# Patient Record
(demographics unavailable — no encounter records)

---

## 2024-12-20 NOTE — REVIEW OF SYSTEMS
[Vomiting] : vomiting [Nausea] : nausea [Fever] : no fever [Chills] : no chills [Dyspnea] : no dyspnea [Chest Pain] : no chest pain [Abdominal Pain] : no abdominal pain [Dysuria] : no dysuria [Pelvic pain] : no pelvic pain
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
## 2024-12-20 NOTE — PHYSICAL EXAM
[Appropriately responsive] : appropriately responsive [No Acute Distress] : no acute distress [Soft] : soft [Non-tender] : non-tender [Non-distended] : non-distended [No Mass] : no mass [FreeTextEntry4] : Clinically well perfused  [FreeTextEntry5] : Breathing comfortably on room air

## 2024-12-20 NOTE — DISCUSSION/SUMMARY
[FreeTextEntry1] : 31yo  presents for confirmation of pregnancy. LMP was 10/28/24. Transvaginal US performed today and pregnancy was confirmed. CRL was 0.88 mm, correlating with 6w6d. GA 6w3d by LMP. Patient at her baseline state of health and will schedule PCAP appointment.  Nausea -Recommended patient can taking vitamin B6 for nausea   Plan -Nuchal translucency US ordered  -Urine G/C performed today, f/u results -RTC around 11-12 weeks GA for PCAP appointment   Whit Birmingham PGY1 D/w Dr. Arboleda, attending

## 2024-12-20 NOTE — DISCUSSION/SUMMARY
[FreeTextEntry1] : 33yo  presents for confirmation of pregnancy. LMP was 10/28/24. Transvaginal US performed today and pregnancy was confirmed. CRL was 0.88 mm, correlating with 6w6d. GA 6w3d by LMP. Patient at her baseline state of health and will schedule PCAP appointment.  Nausea -Recommended patient can taking vitamin B6 for nausea   Plan -Nuchal translucency US ordered  -Urine G/C performed today, f/u results -RTC around 11-12 weeks GA for PCAP appointment   Whit Birmingham PGY1 D/w Dr. Arboleda, attending

## 2025-02-25 NOTE — REASON FOR VISIT
[Home] : at home, [unfilled] , at the time of the visit. [Other Location: e.g. Home (Enter Location, City,State)___] : at [unfilled] [Telehealth (audio & video)] : This visit was provided via telehealth using real-time 2-way audio visual technology. [Verbal consent obtained from patient] : the patient, [unfilled] [Initial Consultation] : an initial consultation for [FreeTextEntry2] : anemia

## 2025-02-25 NOTE — REVIEW OF SYSTEMS
[Fever] : no fever [Chills] : no chills [Night Sweats] : no night sweats [Fatigue] : fatigue [Recent Change In Weight] : ~T no recent weight change [Diarrhea: Grade 0] : Diarrhea: Grade 0 [Negative] : Allergic/Immunologic
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
## 2025-02-25 NOTE — ASSESSMENT
[FreeTextEntry1] : cc: anemia post miscarriage  32. year old female. with Pmhx  recent Miscarriage on 1/25/25  at 14 weeks.  states she was at work when she felt sudden contractions and broke her water and delivered stillbirth,  since then she had heavy menses that are on/ off.  She denies having D&C. Denies CP and SOB,  but admits to passing out but did not seek medical attention  She started Ferrous sulfate was started on 2/20/25 QD that was given by Dr Mtz and she tolerating it well.  most recent labs CBC on 2/18 WBC 9.92 RBC 3.6 hgb 7.8 HCT 26.1%MCV 21.7 PLT 535K no Iron studies available.    Problem # 1 Anemia of iron def ? - CBC, CMP for baseline - nutritional deficiency screen: B12/folate, - bleeding screen: iron studies - hemolytic screen- LDH/hapto.retic - chronic disease markers- ESR/CRP -advised that if syncope recorrus to please fo to nearest ED  -to increased PO iron ferrous sulfate to BID will send colace to prevent constipation.  until we get back iron studies  -Pt advised to go to local lab at a [facility] ASAP and once done to call us back to set up an f/u appointment on friday 2/28/25  after lab draw   to go over lab results via tele. Pt understands and agrees with plan. All questions answered to patients satisfaction.  -tasked [facility] to email and mail her lab orders and [facility] locations .
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
## 2025-03-03 NOTE — ASSESSMENT
[FreeTextEntry1] : cc: anemia post miscarriage 32. year old female. with Pmhx recent Miscarriage on 1/25/25 at 14 weeks. states she was at work when she felt sudden contractions and broke her water and delivered stillbirth, since then she had heavy menses that are on/ off. She denies having D&C. Denies CP and SOB, but admits to passing out but did not seek medical attention She started Ferrous sulfate was started on 2/20/25 QD that was given by Dr Mtz and she tolerating it well. most recent labs CBC on 2/18 WBC 9.92 RBC 3.6 hgb 7.8 HCT 26.1%MCV 21.7 PLT 535K no Iron studies available.  Problem # 1 Anemia of iron def ? - CBC, CMP for baseline - nutritional deficiency screen: B12/folate, - bleeding screen: iron studies - hemolytic screen- LDH/hapto.retic - chronic disease markers- ESR/CRP  3/3/25 anemia of Iron deficiency  using the [place] formula she is 1402  mg Total iron deficit ( weight 202Lb)  Will start with Weekly  IV iron  with feraheme 510 mg x 2 cycles, first cycle 3/9 and 3/13  will check iron studies after 2 nd cycle  Risks, benefits, side effects and administration of tx discussed. Patient verbalized understanding and opted for IV iron Pt consented via telehealth today.

## 2025-03-03 NOTE — REASON FOR VISIT
[Home] : at home, [unfilled] , at the time of the visit. [Other Location: e.g. Home (Enter Location, City,State)___] : at [unfilled] [Telehealth (audio & video)] : This visit was provided via telehealth using real-time 2-way audio visual technology. [Verbal consent obtained from patient] : the patient, [unfilled] [Follow-Up Visit] : a follow-up visit for [FreeTextEntry2] : anemia

## 2025-03-20 NOTE — PHYSICAL EXAM
[No JVD] : no jugular venous distention [Normal] : no joint swelling and grossly normal strength and tone

## 2025-03-20 NOTE — HISTORY OF PRESENT ILLNESS
[de-identified] : 33 y/o here for f/u after several episodes of passing out after bleeding for about one month after Miscarriage 1/25/2025 then bleeding x 1 month passed out 3 times. pt was sent ot hematology and was sent to get Iron infusion x2. Pt now notes feels better now last iron infusion yesterday and states does not have the urge to eat ice is gone. Pt worried since has not had the blood checked and wants to make sure all is ok.

## 2025-03-20 NOTE — REVIEW OF SYSTEMS
[Vaginal Discharge] : vaginal discharge [FreeTextEntry8] : vaginal bleeding spotting will f/u GYN ? polyp

## 2025-03-20 NOTE — HISTORY OF PRESENT ILLNESS
[de-identified] : 33 y/o here for f/u after several episodes of passing out after bleeding for about one month after Miscarriage 1/25/2025 then bleeding x 1 month passed out 3 times. pt was sent ot hematology and was sent to get Iron infusion x2. Pt now notes feels better now last iron infusion yesterday and states does not have the urge to eat ice is gone. Pt worried since has not had the blood checked and wants to make sure all is ok.

## 2025-06-11 NOTE — HISTORY OF PRESENT ILLNESS
[Definite:  ___ (Date)] : the last menstrual period was [unfilled] [Sexually Active] : is sexually active [Contraception] : does not use contraception